# Patient Record
Sex: FEMALE | Race: BLACK OR AFRICAN AMERICAN
[De-identification: names, ages, dates, MRNs, and addresses within clinical notes are randomized per-mention and may not be internally consistent; named-entity substitution may affect disease eponyms.]

---

## 2019-09-14 ENCOUNTER — HOSPITAL ENCOUNTER (EMERGENCY)
Dept: HOSPITAL 44 - ED | Age: 19
Discharge: HOME | End: 2019-09-14
Payer: COMMERCIAL

## 2019-09-14 VITALS — DIASTOLIC BLOOD PRESSURE: 81 MMHG | SYSTOLIC BLOOD PRESSURE: 143 MMHG

## 2019-09-14 DIAGNOSIS — Z3A.00: ICD-10-CM

## 2019-09-14 DIAGNOSIS — O23.43: Primary | ICD-10-CM

## 2019-09-14 PROCEDURE — 81002 URINALYSIS NONAUTO W/O SCOPE: CPT

## 2019-09-14 PROCEDURE — 99281 EMR DPT VST MAYX REQ PHY/QHP: CPT

## 2019-09-14 PROCEDURE — 99282 EMERGENCY DEPT VISIT SF MDM: CPT

## 2019-09-14 NOTE — ED PHYSICIAN DOCUMENTATION
General Adult





- HISTORIAN


Historian: patient





- HPI


Stated Complaint: abdominal "swelling" 


Chief Complaint: General Adult


Onset: other (not sure on timing )


Timing: still present


Severity: mild


Further Comments: yes (She states she is on medicaiton for "infection" although 

she is here now for "it feels swollen" - she indicates with pointing to abdomen 

and she states that she has no pain. She denies any cramping. Denies any loss of

fluid. Denies any pressure in her vagina or rectum. Denies any other 

complaints.)





- ROS


CONST: no problems


GI/: denies: abdominal pain, problems urinating, vomiting, nausea


MS/SKIN/LYMPH: none


NEURO/PSYCH: denies: headache





- PAST HX


Past History: hypertension (only with pregnancy and gestational diabetes which 

per her report is controlled )


Allergies/Adverse Reactions: 


                                    Allergies











Allergy/AdvReac Type Severity Reaction Status Date / Time


 


No Known Allergies Allergy   Verified 09/14/19 22:28














Home Medications: 


                                Ambulatory Orders











 Medication  Instructions  Recorded


 


Nitrofurantoin Macrocrystal 25 mg PO DAILY 09/14/19





[Nitrofurantoin]  


 


Valacyclovir HCl [Valacyclovir] 1 tab PO DAILY 09/14/19














- SOCIAL HX


Smoking History: non-smoker


Alcohol Use: none


Drug Use: none





- FAMILY HX


Family History: No





- REVIEWED ASSESSMENTS


Nursing Assessment  Reviewed: Yes


Vitals Reviewed: Yes





General Adult Physical Exam





- PHYSICAL EXAM


GENERAL APPEARANCE: mild distress


EENT: eye inspection normal, no signs of dehydration


NECK: normal inspection


RESPIRATORY: no resp distress, chest non-tender, breath sounds normal


CVS: reg rate & rhythm, heart sounds normal


ABDOMEN: soft, normal bowel sounds, no distension, non-tender


BACK: normal inspection


SKIN: warm/dry, normal color


EXTREMITIES: non-tender, normal range of motion, no evidence of injury, no edema


NEURO: oriented X3





Discharge


Clincal Impression: 


UTI (urinary tract infection) during pregnancy


Qualifiers:


 Trimester: third trimester Qualified Code(s): O23.43 - Unspecified infection of

 urinary tract in pregnancy, third trimester





Referrals: 


Primary Doctor,No [Primary Care Provider] - 2 Days


Comments: 





1. Continued meds


2. Increase fluids


3. GO TO YOUR DR on your appt Monday 


4. IF you feel any changes, leaking of fluid, decrease in baby movement or 

cramps go to the nearest ER 





Condition: Stable


Disposition: 01 HOME, SELF-CARE


Decision to Admit: NO


Date of Decison to Admit: 09/14/19


Decision Time: 22:27

## 2019-09-16 LAB
APPEARANCE UR: CLEAR
COLOR,URINE: YELLOW
PH UR STRIP: 6.5 [PH] (ref 5–8)
RBC UR QL: NEGATIVE
UROBILINOGEN URINE: 1 EU (ref 0.2–1)